# Patient Record
Sex: MALE | URBAN - METROPOLITAN AREA
[De-identification: names, ages, dates, MRNs, and addresses within clinical notes are randomized per-mention and may not be internally consistent; named-entity substitution may affect disease eponyms.]

---

## 2019-07-23 ENCOUNTER — APPOINTMENT (OUTPATIENT)
Dept: URBAN - METROPOLITAN AREA SURGERY 9 | Age: 84
Setting detail: DERMATOLOGY
End: 2019-07-24

## 2019-07-23 DIAGNOSIS — L43.8 OTHER LICHEN PLANUS: ICD-10-CM

## 2019-07-23 PROBLEM — K21.9 GASTRO-ESOPHAGEAL REFLUX DISEASE WITHOUT ESOPHAGITIS: Status: ACTIVE | Noted: 2019-07-23

## 2019-07-23 PROBLEM — E78.5 HYPERLIPIDEMIA, UNSPECIFIED: Status: ACTIVE | Noted: 2019-07-23

## 2019-07-23 PROBLEM — E13.9 OTHER SPECIFIED DIABETES MELLITUS WITHOUT COMPLICATIONS: Status: ACTIVE | Noted: 2019-07-23

## 2019-07-23 PROCEDURE — 99203 OFFICE O/P NEW LOW 30 MIN: CPT

## 2019-07-23 PROCEDURE — OTHER TREATMENT REGIMEN: OTHER

## 2019-07-23 PROCEDURE — OTHER COUNSELING: OTHER

## 2019-07-23 ASSESSMENT — LOCATION SIMPLE DESCRIPTION DERM
LOCATION SIMPLE: RIGHT THIGH
LOCATION SIMPLE: PERIANAL SKIN

## 2019-07-23 ASSESSMENT — LOCATION DETAILED DESCRIPTION DERM
LOCATION DETAILED: RIGHT ANTERIOR MEDIAL PROXIMAL THIGH
LOCATION DETAILED: PERIANAL SKIN

## 2019-07-23 ASSESSMENT — LOCATION ZONE DERM
LOCATION ZONE: LEG
LOCATION ZONE: ANUS

## 2019-07-23 NOTE — PROCEDURE: TREATMENT REGIMEN
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 \\Alexandra request records and review his prior treatments and biopsy reports from Dr Cata Altamirano ([facility]) and [facility].\\nWill call in a prescription at that point. 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 \\Alexandra request records and review his prior treatments and biopsy reports from Dr Cata Altamirano ([facility]) and [facility].\\nWill call in a prescription at that point.

## 2019-07-31 ENCOUNTER — RX ONLY (RX ONLY)
Age: 84
End: 2019-07-31

## 2019-07-31 RX ORDER — TACROLIMUS 1 MG/G
OINTMENT TOPICAL
Qty: 1 | Refills: 2 | Status: ERX | COMMUNITY
Start: 2019-07-31

## 2019-08-28 ENCOUNTER — APPOINTMENT (OUTPATIENT)
Dept: URBAN - METROPOLITAN AREA SURGERY 9 | Age: 84
Setting detail: DERMATOLOGY
End: 2019-08-29

## 2019-08-28 DIAGNOSIS — L43.8 OTHER LICHEN PLANUS: ICD-10-CM

## 2019-08-28 PROCEDURE — OTHER PRESCRIPTION: OTHER

## 2019-08-28 PROCEDURE — OTHER OTHER: OTHER

## 2019-08-28 PROCEDURE — OTHER COUNSELING: OTHER

## 2019-08-28 PROCEDURE — OTHER TREATMENT REGIMEN: OTHER

## 2019-08-28 PROCEDURE — 99213 OFFICE O/P EST LOW 20 MIN: CPT

## 2019-08-28 RX ORDER — GRISEOFULVIN 500 MG/1
TABLET ORAL
Qty: 60 | Refills: 1 | Status: ERX | COMMUNITY
Start: 2019-08-28

## 2019-08-28 RX ORDER — CRISABOROLE 20 MG/G
OINTMENT TOPICAL
Qty: 1 | Refills: 1 | Status: ERX | COMMUNITY
Start: 2019-08-28

## 2019-08-28 ASSESSMENT — LOCATION SIMPLE DESCRIPTION DERM
LOCATION SIMPLE: PERIANAL SKIN
LOCATION SIMPLE: RIGHT THIGH

## 2019-08-28 ASSESSMENT — LOCATION ZONE DERM
LOCATION ZONE: LEG
LOCATION ZONE: ANUS

## 2019-08-28 ASSESSMENT — LOCATION DETAILED DESCRIPTION DERM
LOCATION DETAILED: RIGHT ANTERIOR MEDIAL PROXIMAL THIGH
LOCATION DETAILED: PERIANAL SKIN

## 2019-08-28 NOTE — PROCEDURE: OTHER
Note Text (......Xxx Chief Complaint.): This diagnosis correlates with the
Detail Level: Simple
Other (Free Text): Annular erythematous thin plaques with hyperpigmented rim

## 2019-08-28 NOTE — PROCEDURE: TREATMENT REGIMEN
Detail Level: Detailed
Plan: hydroxychloroquine has increased risk due to his renal impairment. \\nAlso discussed griseofulvin as an option. Will try Griseofulvin along with eucrisa. \\n\\nHad a discussion with patient, explaining that at this point there is no tried and true treatment for this condition, so it will be trial and error, and medications may not be helpful. Patient verbalized understanding.
Discontinue Regimen: Tacrolimus ointment (not helping)
Initiate Treatment: Eucrisa ointment twice daily and griseofulvin PO as directed